# Patient Record
Sex: MALE | URBAN - METROPOLITAN AREA
[De-identification: names, ages, dates, MRNs, and addresses within clinical notes are randomized per-mention and may not be internally consistent; named-entity substitution may affect disease eponyms.]

---

## 2019-10-16 ENCOUNTER — OFFICE VISIT (OUTPATIENT)
Dept: URBAN - METROPOLITAN AREA CLINIC 22 | Facility: CLINIC | Age: 34
End: 2019-10-16
Payer: COMMERCIAL

## 2019-10-16 PROCEDURE — 99203 OFFICE O/P NEW LOW 30 MIN: CPT | Performed by: OPTOMETRIST

## 2019-10-16 NOTE — IMPRESSION/PLAN
Impression: Viral conjunctivitis, unspecified: B30.9. OU. Plan: AT's QID OU. Pt ed re: contagious nature of condition - wash hands frequently, don't rub eyes, emphasized hygiene at home. RTC 1 week x follow up. Consider zylet x inflammation.

## 2019-10-23 ENCOUNTER — OFFICE VISIT (OUTPATIENT)
Dept: URBAN - METROPOLITAN AREA CLINIC 22 | Facility: CLINIC | Age: 34
End: 2019-10-23
Payer: COMMERCIAL

## 2019-10-23 DIAGNOSIS — B30.9 VIRAL CONJUNCTIVITIS, UNSPECIFIED: Primary | ICD-10-CM

## 2019-10-23 PROCEDURE — 99212 OFFICE O/P EST SF 10 MIN: CPT | Performed by: OPTOMETRIST

## 2019-10-23 ASSESSMENT — INTRAOCULAR PRESSURE: OD: 17

## 2019-10-23 NOTE — IMPRESSION/PLAN
Impression: Viral conjunctivitis, unspecified: B30.9. OU. Plan: AT's QID OU. Zylet BID OS. Continue hygiene - wash hands, don't rub eyes, etc.
RTC if condition worsens. otherwise, RTC 1 year.